# Patient Record
Sex: FEMALE | Race: OTHER | HISPANIC OR LATINO | URBAN - METROPOLITAN AREA
[De-identification: names, ages, dates, MRNs, and addresses within clinical notes are randomized per-mention and may not be internally consistent; named-entity substitution may affect disease eponyms.]

---

## 2019-11-06 ENCOUNTER — EMERGENCY (EMERGENCY)
Facility: HOSPITAL | Age: 49
LOS: 1 days | Discharge: HOME | End: 2019-11-06
Attending: STUDENT IN AN ORGANIZED HEALTH CARE EDUCATION/TRAINING PROGRAM
Payer: MEDICAID

## 2019-11-06 VITALS
WEIGHT: 160.06 LBS | DIASTOLIC BLOOD PRESSURE: 89 MMHG | HEART RATE: 87 BPM | RESPIRATION RATE: 24 BRPM | OXYGEN SATURATION: 100 % | TEMPERATURE: 98 F | SYSTOLIC BLOOD PRESSURE: 166 MMHG | HEIGHT: 62 IN

## 2019-11-06 PROCEDURE — 99283 EMERGENCY DEPT VISIT LOW MDM: CPT

## 2019-11-06 NOTE — ED ADULT TRIAGE NOTE - CHIEF COMPLAINT QUOTE
Pt. complaining of pain to back upper left tooth.  Pt. states "I was lying down before and my tooth started bleeding and I got really nervous."  Pt. states she gargled with salt water and bleeding stopped but she "stills feels very nervous and shaking".  Pt. states a few weeks ago, she lost a portion of the tooth that was bleeding tonight.  Pt. with history of DM; pt. attempting to control with diet and exercise.

## 2019-11-07 PROCEDURE — 82962 GLUCOSE BLOOD TEST: CPT

## 2019-11-07 PROCEDURE — 99283 EMERGENCY DEPT VISIT LOW MDM: CPT

## 2019-11-07 RX ORDER — ACETAMINOPHEN 500 MG
650 TABLET ORAL ONCE
Refills: 0 | Status: COMPLETED | OUTPATIENT
Start: 2019-11-07 | End: 2019-11-07

## 2019-11-07 RX ORDER — HYDROXYZINE HCL 10 MG
50 TABLET ORAL ONCE
Refills: 0 | Status: COMPLETED | OUTPATIENT
Start: 2019-11-07 | End: 2019-11-07

## 2019-11-07 RX ADMIN — Medication 650 MILLIGRAM(S): at 01:28

## 2019-11-07 RX ADMIN — Medication 50 MILLIGRAM(S): at 01:29

## 2019-11-07 NOTE — ED PROVIDER NOTE - CLINICAL SUMMARY MEDICAL DECISION MAKING FREE TEXT BOX
48 y/o female presents to the ED c/o bleeding from the gum with associated pain. no active bleeding occurring, will give meds, pt also noted to be anxious appearing, will give meds, f/u with dental clinic and pcp.

## 2019-11-07 NOTE — ED PROVIDER NOTE - OBJECTIVE STATEMENT
48 y/o female presents to the ED c/o bleeding from the gum with associated pain. Pt notes she has seen a dental surgery fro a cracked tooth in the past. notes the tooth has been dislodged from the gum 4 years ago but states she cannot due surgery because of her anxiety. Notes tonight that she began to feel pain in the left upper gum and notes some bleeding from the gum. States does not recall a specific event that caused the bleeding, denies eating a specific food or trauma to the mouth that caused the bleeding. Bleeding was minimal in nature and she gargled salt water and the bleeding stopped but she got worried about the bleeding and her anxiety started to act up. States she took motrin for the pain. Denies difficulty breathing, nausea, vomiting, ear pain, shortness of breath, chest pain, palpitations

## 2019-11-07 NOTE — ED PROVIDER NOTE - PATIENT PORTAL LINK FT
You can access the FollowMyHealth Patient Portal offered by [facility] by registering at the following website: [email or web address]. By joining [employer]’s FollowMyHealth portal, you will also be able to view your health information using other applications (apps) compatible with our system.

## 2019-11-07 NOTE — ED PROVIDER NOTE - ATTENDING CONTRIBUTION TO CARE
I personally saw the patient with the PA, and completed the key components of the history and physical exam. I then discussed the management plan with the PA.    pt with bleeding from socket from avulsed tooth, no active bleeding here, nad, follow up with dentist

## 2019-11-07 NOTE — ED PROVIDER NOTE - PHYSICAL EXAMINATION
Mouth- left 1st molar with dental avulsion, no active bleeding from the socket, no peridental abscess noted,

## 2020-08-26 PROBLEM — Z00.00 ENCOUNTER FOR PREVENTIVE HEALTH EXAMINATION: Status: ACTIVE | Noted: 2020-08-26

## 2020-09-10 ENCOUNTER — APPOINTMENT (OUTPATIENT)
Dept: ORTHOPEDIC SURGERY | Facility: CLINIC | Age: 50
End: 2020-09-10

## 2021-01-31 NOTE — ED ADULT NURSE NOTE - CHIEF COMPLAINT QUOTE
Pt. complaining of pain to back upper left tooth.  Pt. states "I was lying down before and my tooth started bleeding and I got really nervous."  Pt. states she gargled with salt water and bleeding stopped but she "stills feels very nervous and shaking".  Pt. states a few weeks ago, she lost a portion of the tooth that was bleeding tonight.  Pt. with history of DM; pt. attempting to control with diet and exercise.
31-Jan-2021 16:45

## 2022-11-18 NOTE — ED ADULT NURSE NOTE - FINAL NURSING ELECTRONIC SIGNATURE
----- Message from Zully Vargas RN sent at 11/18/2022  8:29 AM CST -----  This pt needs to get in to see RIGO Aceves in the next few weeks to see RIGO Aceves with labs prior to follow up after new Tyvaso (treprostinil) Start.      Zully Vargas RN BSN [facility]     07-Nov-2019 02:06

## 2024-04-05 NOTE — ED PROVIDER NOTE - RESPIRATORY NEGATIVE STATEMENT, MLM
Pt should have f/u, will be 1 year since last visit in June.   no chest pain, no cough, and no shortness of breath.